# Patient Record
Sex: MALE | Race: WHITE
[De-identification: names, ages, dates, MRNs, and addresses within clinical notes are randomized per-mention and may not be internally consistent; named-entity substitution may affect disease eponyms.]

---

## 2019-07-07 ENCOUNTER — HOSPITAL ENCOUNTER (INPATIENT)
Dept: HOSPITAL 74 - YASAS | Age: 29
LOS: 3 days | Discharge: HOME | End: 2019-07-10
Attending: SURGERY | Admitting: SURGERY
Payer: COMMERCIAL

## 2019-07-07 VITALS — BODY MASS INDEX: 23.7 KG/M2

## 2019-07-07 DIAGNOSIS — D69.6: ICD-10-CM

## 2019-07-07 DIAGNOSIS — E86.0: ICD-10-CM

## 2019-07-07 DIAGNOSIS — R94.5: ICD-10-CM

## 2019-07-07 DIAGNOSIS — F17.210: ICD-10-CM

## 2019-07-07 DIAGNOSIS — F10.230: Primary | ICD-10-CM

## 2019-07-07 PROCEDURE — HZ2ZZZZ DETOXIFICATION SERVICES FOR SUBSTANCE ABUSE TREATMENT: ICD-10-PCS | Performed by: SURGERY

## 2019-07-07 RX ADMIN — Medication PRN MG: at 23:27

## 2019-07-07 RX ADMIN — Medication SCH MG: at 23:26

## 2019-07-07 RX ADMIN — NICOTINE SCH MG: 21 PATCH TRANSDERMAL at 14:36

## 2019-07-07 NOTE — HP
CIWA Score


Nausea/Vomitin


Muscle Tremors: 2


Anxiety: 2


Agitation: 3


Paroxysmal Sweats: 1-Minimal Palms Moist


Orientation: 0-Oriented


Tacttile Disturbances: 1-Very Mild Itch/Numbness


Auditory Disturbances: 1-Very Mild


Visual Disturbances: 0-None


Headache: 2-Mild


CIWA-Ar Total Score: 14





- Admission Criteria


[facility] Guidelines: Admission for Medically Managed Detox: 


Requires at least one of the followin. CIWA greater than 12


2. Seizures within the past 24 hours


3. Delirium tremens within the past 24 hours


4. Hallucinations within the past 24 hours


5. Acute intervention needed for co  occurring medical disorder


6. Acute intervention needed for co  occurring psychiatric disorder


7. Severe withdrawal that cannot be handled at a lower level of care (continued


    vomiting, continued diarrhea, abnormal vital signs) requiring intravenous


    medication and/or fluids


8. Pregnancy








Admission ROS [facility]





- HPI


Chief Complaint: 





i need help to stop drinking alcohol


Allergies/Adverse Reactions: 


 Allergies











Allergy/AdvReac Type Severity Reaction Status Date / Time


 


No Known Allergies Allergy   Verified 19 10:38











History of Present Illness: 





this 29 years old male with alcohol dependence seeking detox,withdrawal symptom


never been in detox before


nicotine dependence 15 cigarette/day


seen on Friday at [facility],receiving medication


Exam Limitations: No Limitations





- Ebola screening


Have you traveled outside of the country in the last 21 days: No (N)


Have you had contact with anyone from an Ebola affected area: No


Do you have a fever: No





- Review of Systems


Constitutional: Loss of Appetite, Night Sweats, Changes in sleep, Weakness, 

Weight Stable, Unintentional Wgt. Loss


EENT: reports: Nose Congestion


Respiratory: reports: No Symptoms reported


Cardiac: reports: No Symptoms Reported


GI: reports: Diarrhea, Nausea, Poor Appetite, Abdominal cramping


: reports: No Symptoms Reported


Musculoskeletal: reports: Back Pain, Muscle Pain


Integumentary: reports: Dryness


Neuro: reports: Headache, Tremors


Endocrine: reports: No Symptoms Reported


Hematology: reports: No Symptoms Reported


Psychiatric: reports: No Sypmtoms Reported, Judgement Intact, Mood/Affect 

Appropiate, Orientated x3


Other Systems: Reviewed and Negative





Patient History





- Patient Medical History


Hx Anemia: No


Hx Asthma: No


Hx Chronic Obstructive Pulmonary Disease (COPD): No


Hx Cancer: No


Hx Cardiac Disorders: No


Hx Congestive Heart Failure: No


Hx Hypertension: No


Hx Hypercholesterolemia: No


Hx Pacemaker: No


HX Cerebrovascular Accident: No


Hx Seizures: No


Hx Dementia: No


Hx Diabetes: No


Hx Gastrointestinal Disorders: No


Hx Liver Disease: No


Hx Genitourinary Disorders: No


Hx Sexually Transmitted Disorders: No


Hx Renal Disease (ESRD): No


Hx Thyroid Disease: No


Hx Human Immunodeficiency Virus (HIV): No (lst  negative)


Hx Hepatitis C: No


Hx Depression: No


Hx Suicide Attempt: No


Hx Bipolar Disorder: No


Hx Schizophrenia: No


Other Medical History: no sucidal,no homicidal





- Patient Surgical History


Past Surgical History: No





- PPD History


Previous Implant?: Yes


Documented Results: Negative w/o proof


Implanted On Prior [facility] Admission?: No


PPD to be Administered?: No





- Smoking Cessation


Smoking history: Current every day smoker


Have you smoked in the past 12 months: Yes


Aproximately how many cigarettes per day: 15


Cigars Per Day: 0


Hx Chewing Tobacco Use: No


Initiated information on smoking cessation: Yes


'Breaking Loose' booklet given: 19





- Substance & Tx. History


Hx Alcohol Use: Yes


Hx Substance Use: No


Substance Use Type: Alcohol


Hx Substance Use Treatment: No





- Substances abused


  ** Alcohol


Substance route: Oral


Frequency: Daily


Amount used: 18 CANS OF BEER (12 OUNCES)


Age of first use: 11


Date of last use: 19





Family Disease History





- Family Disease History


Family History: Denies





Admission Physical Exam [facility]





- Vital Signs


Vital Signs: 


 Vital Signs - 24 hr











  19





  10:38


 


Temperature 97.5 F L


 


Pulse Rate 73


 


Respiratory 18





Rate 


 


Blood Pressure 141/101 H














- Physical


General Appearance: Yes: Moderate Distress, Tremorous, Irritable, Sweating, 

Anxious


HEENTM: Yes: Normal ENT Inspection, EDDY, Pharynx Normal


Respiratory: Yes: Lungs Clear, Normal Breath Sounds, No Respiratory Distress


Neck: Yes: Within Normal Limits, Supple, Trachea in good position


Breast: Yes: Within Normal Limits


Cardiology: Yes: Within Normal Limits, Regular Rhythm, Regular Rate, S1, S2


Abdominal: Yes: Within Normal Limits, Normal Bowel Sounds, Non Tender, Flat, 

Soft


Genitourinary: Yes: Within Normal Limits


Back: Yes: Muscle Spasm


Musculoskeletal: Yes: full range of Motion, Back pain, Muscle Pain


Extremities: Yes: Tremors


Neurological: Yes: CNs II-XII NML intact, Fully Oriented, Alert, Motor Strength 

5/5


Integumentary: Yes: Dry


Lymphatic: Yes: Within Normal Limits





- Diagnostic


(1) Alcohol dependence with uncomplicated withdrawal


Current Visit: Yes   Status: Acute   





(2) Dehydration


Current Visit: Yes   Status: Acute   





(3) Nicotine dependence


Current Visit: Yes   Status: Acute   





Cleared for Admission S





- Detox or Rehab


[facility] Level of Care: Medically Managed


Detox Regimen/Protocol: Librium





Breathalyzer





- Breathalyzer


Breathalyzer: 0





Urine Drug Screen





- Test Device


Lot number: [id number]


Expiration date: 21





- Control


Is test valid?: Yes





- Results


Drug screen NEGATIVE: No


Urine drug screen results: BZO-Benzodiazepines





Inpatient Rehab Admission





- Rehab Decision to Admit


Inpatient rehab admission?: No

## 2019-07-08 LAB
ALBUMIN SERPL-MCNC: 3.6 G/DL (ref 3.4–5)
ALP SERPL-CCNC: 99 U/L (ref 45–117)
ALT SERPL-CCNC: 69 U/L (ref 13–61)
ANION GAP SERPL CALC-SCNC: 6 MMOL/L (ref 8–16)
AST SERPL-CCNC: 102 U/L (ref 15–37)
BILIRUB SERPL-MCNC: 0.8 MG/DL (ref 0.2–1)
BUN SERPL-MCNC: 6.2 MG/DL (ref 7–18)
CALCIUM SERPL-MCNC: 9 MG/DL (ref 8.5–10.1)
CHLORIDE SERPL-SCNC: 106 MMOL/L (ref 98–107)
CO2 SERPL-SCNC: 29 MMOL/L (ref 21–32)
CREAT SERPL-MCNC: 0.7 MG/DL (ref 0.55–1.3)
DEPRECATED RDW RBC AUTO: 12.8 % (ref 11.9–15.9)
GLUCOSE SERPL-MCNC: 97 MG/DL (ref 74–106)
HCT VFR BLD CALC: 40.4 % (ref 35.4–49)
HGB BLD-MCNC: 13.5 GM/DL (ref 11.7–16.9)
MCH RBC QN AUTO: 30.8 PG (ref 25.7–33.7)
MCHC RBC AUTO-ENTMCNC: 33.5 G/DL (ref 32–35.9)
MCV RBC: 92.2 FL (ref 80–96)
PLATELET # BLD AUTO: 116 K/MM3 (ref 134–434)
PMV BLD: 9.8 FL (ref 7.5–11.1)
POTASSIUM SERPLBLD-SCNC: 3.6 MMOL/L (ref 3.5–5.1)
PROT SERPL-MCNC: 7 G/DL (ref 6.4–8.2)
RBC # BLD AUTO: 4.38 M/MM3 (ref 4–5.6)
SODIUM SERPL-SCNC: 141 MMOL/L (ref 136–145)
WBC # BLD AUTO: 4.3 K/MM3 (ref 4–10)

## 2019-07-08 RX ADMIN — Medication SCH TAB: at 10:55

## 2019-07-08 RX ADMIN — NICOTINE SCH MG: 21 PATCH TRANSDERMAL at 10:55

## 2019-07-08 RX ADMIN — Medication SCH MG: at 22:09

## 2019-07-08 RX ADMIN — Medication PRN MG: at 22:10

## 2019-07-08 NOTE — EKG
Test Reason : 

Blood Pressure : ***/*** mmHG

Vent. Rate : 060 BPM     Atrial Rate : 060 BPM

   P-R Int : 122 ms          QRS Dur : 094 ms

    QT Int : 418 ms       P-R-T Axes : 044 057 029 degrees

   QTc Int : 418 ms

 

SINUS RHYTHM WITH MARKED SINUS ARRHYTHMIA

OTHERWISE NORMAL ECG

NO PREVIOUS ECGS AVAILABLE

Confirmed by ADALBERTO DAS MD ([id number]) on 7/8/2019 11:46:07 AM

 

Referred By:             Confirmed By:ADALBERTO DAS MD

## 2019-07-08 NOTE — PN
[facility] CIWA





- CIWA Score


Nausea/Vomitin (Stomach Cramping, Diarrhea.)


Muscle Tremors: 3


Anxiety: 3


Agitation: 0-Normal Activity


Paroxysmal Sweats: No Perspiration


Orientation: 0-Oriented


Tacttile Disturbances: 2-Mild Itch/Numbness/Burn


Auditory Disturbances: 0-None


Visual Disturbances: 2-Mild Sensitivity


Headache: 0-None Present


CIWA-Ar Total Score: 12





[facility] Progress Note (SOAP)


Subjective: 





Tremors, Interrupted Sleep, Stomach Cramping, Diarrhea.


Objective: 


PATIENT A & O X 3, OBSERVED AMBULATING ON UNIT UNASSISTED. IN NO ACUTE DISTRESS.





19 14:27


 


 Vital Signs











Temperature  98.5 F   19 09:29


 


Pulse Rate  90   19 09:29


 


Respiratory Rate  18   19 09:29


 


Blood Pressure  126/85   19 09:29


 


O2 Sat by Pulse Oximetry (%)      











 Laboratory Tests











  19





  08:00 08:00 08:00


 


WBC  4.3  


 


RBC  4.38  


 


Hgb  13.5  


 


Hct  40.4  


 


MCV  92.2  


 


MCH  30.8  


 


MCHC  33.5  


 


RDW  12.8  


 


Plt Count  116 L  


 


MPV  9.8  


 


Sodium   141 


 


Potassium   3.6 


 


Chloride   106 


 


Carbon Dioxide   29 


 


Anion Gap   6 L 


 


BUN   6.2 L 


 


Creatinine   0.7 


 


Est GFR (CKD-EPI)AfAm   147.81 


 


Est GFR (CKD-EPI)NonAf   127.53 


 


Random Glucose   97 


 


Calcium   9.0 


 


Total Bilirubin   0.8 


 


AST   102 H 


 


ALT   69 H 


 


Alkaline Phosphatase   99 


 


Total Protein   7.0 


 


Albumin   3.6 


 


RPR Titer    Nonreactive








LABS NOTED.


Assessment: 





19 14:28


WITHDRAWAL SYMPTOMS.


THROMBOCYTOPENIA.


ELEAVTED AST LEVEL.


ELEVATED ALT LEVEL.





19 14:29


Plan: 





CONTINUE DETOX.


INCREASE DAILY PO WATER INTAKE.


PRN PEPTO-BISMOL PO FOR DIARRHEA.


REPEAT AST LEVEL TOMORROW AM FOR ELEVATED AST LEVEL NOTED ON DETOX ADMISSION 

LABORATORY ASSESSMENT.

## 2019-07-09 RX ADMIN — Medication SCH TAB: at 10:28

## 2019-07-09 RX ADMIN — Medication SCH MG: at 22:27

## 2019-07-09 RX ADMIN — NICOTINE SCH MG: 21 PATCH TRANSDERMAL at 10:28

## 2019-07-09 RX ADMIN — Medication PRN MG: at 22:28

## 2019-07-09 NOTE — PN
[facility] CIWA





- CIWA Score


Nausea/Vomitin


Muscle Tremors: 2


Anxiety: 2


Agitation: 2


Paroxysmal Sweats: 1-Minimal Palms Moist


Orientation: 0-Oriented


Tacttile Disturbances: 1-Very Mild Itch/Numbness


Auditory Disturbances: 1-Very Mild


Visual Disturbances: 0-None


Headache: 1-Very Mild


CIWA-Ar Total Score: 12





[facility] Progress Note (SOAP)


Subjective: 





alert,irritable,anxious,interrupted sleep,tremor


Objective: 





19 11:02


 Vital Signs











Temperature  97.3 F L  19 09:30


 


Pulse Rate  79   19 09:30


 


Respiratory Rate  18   19 09:30


 


Blood Pressure  127/74   19 09:30


 


O2 Sat by Pulse Oximetry (%)      











19 11:02


repeat ast pending


Assessment: 





19 11:02


withdrawal symptom


Plan: 





continue detox librium regimen

## 2019-07-10 VITALS — TEMPERATURE: 97.7 F | DIASTOLIC BLOOD PRESSURE: 61 MMHG | SYSTOLIC BLOOD PRESSURE: 110 MMHG | HEART RATE: 86 BPM

## 2019-07-10 NOTE — DS
[facility] Detox Discharge Summary


Admission Date: 


07/07/19





Discharge Date: 07/10/19





- History


Present History: Alcohol Dependence


Additional Comments: 





pt states he needs to leave today as he needs to  his daughter. Pt given 

information re treatment for AUD with medication options. Pt will find 

appropriate PCP- with insurance carrier. 





- Physical Exam Results


Vital Signs: 


 Vital Signs











Temperature  97.7 F   07/10/19 09:24


 


Pulse Rate  86   07/10/19 09:24


 


Respiratory Rate  18   07/10/19 09:24


 


Blood Pressure  110/61   07/10/19 09:24


 


O2 Sat by Pulse Oximetry (%)      














- Treatment


Hospital Course: Detox Protocol Followed, Detoxed Safely, Responded well, 

Discharged Condition Good





- Medication


Discharge Medications: 


Ambulatory Orders





NK [No Known Home Medication]  07/07/19 











- Diagnosis


(1) Alcohol dependence with uncomplicated withdrawal


Status: Acute   





(2) Nicotine dependence


Status: Acute   





- AMA


Did Patient Leave Against Medical Advice: No